# Patient Record
Sex: FEMALE | Race: WHITE
[De-identification: names, ages, dates, MRNs, and addresses within clinical notes are randomized per-mention and may not be internally consistent; named-entity substitution may affect disease eponyms.]

---

## 2017-04-19 ENCOUNTER — HOSPITAL ENCOUNTER (EMERGENCY)
Dept: HOSPITAL 10 - FTE | Age: 37
LOS: 1 days | Discharge: HOME | End: 2017-04-20
Payer: COMMERCIAL

## 2017-04-19 VITALS
BODY MASS INDEX: 39.45 KG/M2 | WEIGHT: 222.67 LBS | WEIGHT: 222.67 LBS | BODY MASS INDEX: 39.45 KG/M2 | HEIGHT: 63 IN | HEIGHT: 63 IN

## 2017-04-19 DIAGNOSIS — M62.838: Primary | ICD-10-CM

## 2017-04-19 DIAGNOSIS — I10: ICD-10-CM

## 2017-04-19 PROCEDURE — 72040 X-RAY EXAM NECK SPINE 2-3 VW: CPT

## 2017-04-19 RX ADMIN — IBUPROFEN ONE MG: 200 TABLET, FILM COATED ORAL at 23:08

## 2017-04-19 RX ADMIN — IBUPROFEN ONE MG: 200 TABLET, FILM COATED ORAL at 23:06

## 2017-04-19 NOTE — ERD
ER Documentation


Chief Complaint


Date/Time


DATE: 4/19/17 


TIME: 23:09


Chief Complaint


left arm numbness x 1 week, left knee pain x 2 days, strength on L hand 5/5





HPI


This 36-year-old female presents to emergency department reporting that her 

left arm has been falling asleep at night for the last week and a half.  

Currently it has been continuing to feel numbness and tingling throughout the 

day.  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

, has recently been diagnosed with fibromyalgia, is not started on any 

medication yet.  Patient denies lupus, rheumatoid arthritis.  Reports current 

symptoms include heavy arms, hip pain, neck pain and stiffness, joint pain and 

swelling.  Patient reports that her left leg has also felt "tingling".  States 

that her left knee gave out today.  Patient denies falling, hitting her head or 

any other injury.  Patient denies change in vision or speech, denies headache.





ROS


All systems reviewed and are negative except as per history of present illness.





Medications


Home Meds


Active Scripts


Ibuprofen* (Motrin*) 600 Mg Tab, 600 MG PO Q6H Y for PAIN AND OR ELEVATED TEMP, 

#30 TAB


   Prov:NUHA FRANKS NP         10/25/16


Docusate Sodium* (Colace*) 100 Mg Capsule, 100 MG PO TID, #30 CAP


   Prov:NUHA FRANKS NP         10/25/16


Ferrous Sulfate* (Ferrous Sulfate*) 325 Mg Tabec, 325 MG PO BID, #60 TAB


   Prov:NUHA FRANKS NP         10/25/16


Nitrofurantoin Monohyd Macrocr* (Macrobid*) 100 Mg Capsr, 100 MG PO BID for 7 

Days, #14 CAP 0 Refills


   Prov:ADARSH MEDINA PA-C         6/3/16


Ibuprofen* (Motrin*) 600 Mg Tab, 600 MG PO Q6, #15 TAB


   Prov:PHANI ISABEL MD         11/17/15


Azithromycin* (Zithromax*) 250 Mg Tablet, 250 MG PO .ZPACK AS DIRECTED, #6 TAB


   TAKE 500 MG (2 TABS) THE FIRST DAY THEN 250 MG (1 TAB) DAYS 2-5


   Prov:PHANI ISABEL MD         11/17/15


Promethazine w/Codeine (Phenergan w/Codeine Syrup) 5 Ml Syrup, 5 ML PO Q6 Y for 

COUGH for 7 Days, ML


   6 OZ


   Prov:PHANI ISABEL MD         11/17/15


Reported Medications


[No Meds]   No Conflict Check


   12/4/13





Allergies


Allergies:  


Coded Allergies:  


     No Known Allergy (Unverified , 12/4/13)





PMhx/Soc


History of Surgery:  No


Anesthesia Reaction:  No


Hx Neurological Disorder:  No


Hx Respiratory Disorders:  No


Hx Cardiac Disorders:  Yes (HTN)


Hx Psychiatric Problems:  No


Hx Miscellaneous Medical Probl:  Yes (UTERINE POLYP)


Hx Alcohol Use:  No


Hx Substance Use:  No


Hx Tobacco Use:  No





Physical Exam


Vitals





Vital Signs








  Date Time  Temp Pulse Resp B/P Pulse Ox O2 Delivery O2 Flow Rate FiO2


 


4/19/17 19:43 98.3 118 20 134/67 98   





Vitals stable, heart rate elevated at 118, blood pressure 134/67.  Afebrile.  

Triage notes reviewed


Physical Exam


Const:     Obese, no acute distress


Head:   Atraumatic 


Eyes:    Normal Conjunctiva, PERRLA, EOMI


ENT:    Normal External Ears, Nose and Mouth.  Mucous membranes moist


Neck:               Full range of motion.  Paraspinal tenderness, trapezial and 

rhomboid tenderness  bilaterally; left greater than right


Resp:    


Cardio:    


Abd:    


Skin:   


Back:    No midline or flank tenderness


Ext:    


Neur:    Awake and alert


Psych:    Normal Mood and Affect


Results 24 hrs





 Current Medications








 Medications


  (Trade)  Dose


 Ordered  Sig/Radha


 Route


 PRN Reason  Start Time


 Stop Time Status Last Admin


Dose Admin


 


 Ibuprofen


  (Motrin)  400 mg  ONCE  ONCE


 PO


   4/19/17 23:00


 4/19/17 23:09 DC  


 


 


 Diazepam


  (Valium)  5 mg  ONCE  ONCE


 PO


   4/19/17 23:30


 4/19/17 23:31 DC 4/19/17 23:18


 


 


 Acetaminophen


  (Tylenol Tab)  650 mg  ONCE  ONCE


 PO


   4/19/17 23:30


 4/19/17 23:31 DC 4/19/17 23:18


 











Procedures/MDM


PROCEDURE:   XR Cervical Spine. 


 


CLINICAL INDICATION:   Radiculopathy.


 


TECHNIQUE:   Three views of the cervical spine were performed. The images were 

reviewed on a PACS workstation. 


 


COMPARISON:   None. 


 


FINDINGS:


 


No fracture is identified. There is reversal of the normal cervical lordosis. 

Alignment is otherwise maintained.  There is maintenance of height of the 

vertebral bodies.  Bone mineralization is within normal limits.  There is mild 

predominately anterior endplate hypertrophic changes greatest at C4-5..  

Prevertebral soft tissues are unremarkable.  


 


IMPRESSION:


 


1.  Reversal of the normal cervical lordosis, most commonly seen with muscle 

spasm.


2.  No acute fracture or subluxation.


3.  Predominately anterior endplate hypertrophic changes greatest at C4-5.


 


.Gurwinder Rankin MD, MD           Date    Time 


Electronically viewed and signed by .Gurwinder Rankin MD, MD on 04/20/2017 00:15 








This 36-year-old female presents to emergency department with neck stiffness, 

left arm tingling starting at night progressing to throughout the day over the 

last week and a half.  History of fibromyalgia, patient reports that she is 

working with a new doctor who will be starting her on medication for 

fibromyalgia once her laboratory results come back.  Patient denies any pre-

existing injury.  She has been evaluated and ruled out for lupus and rheumatoid 

arthritis.  Differential diagnosis includes but is not limited to cerebral 

lesion, spinal cord lesion.  Degenerative disc disease, radiculopathy, disc 

herniation.  Fibromyalgia.  Physical exam and history do not support cerebral 

lesion or spinal cord lesion, cervical x-ray obtained with findings of no 

fracture identified.  There is reversal of the normal cervical lordosis.  

Alignment is otherwise maintained.  There is maintenance of the height of the 

vertebral bodies.  Bone mineralization is within normal limits.  There is mild 

predominantly anterior endplate hypertrophic changes greatest at C4-C5.  

Prevertebral soft tissues are unremarkable.  Impression documents reversal of 

the normal cervical lordosis most commonly seen with muscle spasm.  No acute 

fracture or subluxation.  Predominantly anterior endplate hypertrophic changes 

greatest at C4-C5.  Patient given Tylenol and Valium for pain, reassessed after 

60 minutes with improvement of symptoms.  I feel patient is excellent candidate 

for outpatient management and treatment by primary care physician.  Patient 

will be sent home with Motrin, Valium.  Return to emergency department as 

needed worsening of symptoms I feel the patient is stable for discharge at this 

time.  I have discussed results, examination findings, the treatment plan with 

the patient and family present prior to discharge.  Indications for emergent 

reevaluation, side effects of medication were also discussed.  All questions 

were answered.  Patient verbalizes understanding and agrees with plan of care.





Departure


Diagnosis:  


 Primary Impression:  


 Cervical paraspinal muscle spasm


Condition:  Good


Patient Instructions:  Neck Sprain/Strain





Additional Instructions:  


Thank you for for coming to [facility] for your care today. 

Please ask your nurse or provider if you have questions about your care today 

and do not leave until all your questions have been answered.  Please use any 

medications given as directed and follow-up with your doctor (or the doctor you 

were referred to) in the next 2-3 days. If you do not have a primary care 

doctor you may follow up at the [facility] (listed below). You may also 

use motrin and tylenol as needed for fever and/or pain unless instructed 

otherwise by your provider or nurse. Indications for more urgent follow-up have 

been discussed, but you may return to the Emergency Department at ANY time for 

any worrisome or worsening symptoms.





If you have abdominal pain, please know that no test or exam you received is 

perfect and you should follow up within 8 hours for continued pain.





If you had any imaging studies today, such as an X-Ray or CT Scan, these 

studies will be reviewed later by a radiologist. You will be called if there 

are important findings that were not identified today, so make sure the contact 

information you provided at registration is correct.





If you received any narcotic pain control medicine today, such as Vicodin, 

Morphine or Dilaudid, your coordination and judgment may be affected for a 

number of hours. Please do not drive or operate heavy machinery, and you may 

want someone to assist you at home. If you were given a prescription for 

narcotic medication, be aware that it is very addictive- use sparingly and only 

if necessary.











CHAD ABRAHAM Apr 19, 2017 23:16

## 2017-04-20 VITALS
SYSTOLIC BLOOD PRESSURE: 133 MMHG | DIASTOLIC BLOOD PRESSURE: 72 MMHG | RESPIRATION RATE: 20 BRPM | HEART RATE: 97 BPM | TEMPERATURE: 98 F

## 2017-04-20 NOTE — RADRPT
PROCEDURE:   XR Cervical Spine. 

 

CLINICAL INDICATION:   Radiculopathy.

 

TECHNIQUE:   Three views of the cervical spine were performed. The images were reviewed on a PACS [place]. 

 

COMPARISON:   None. 

 

FINDINGS:

 

No fracture is identified. There is reversal of the normal cervical lordosis. Alignment is otherwise
 maintained.  There is maintenance of height of the vertebral bodies.  Bone mineralization is within
 normal limits.  There is mild predominately anterior endplate hypertrophic changes greatest at C4-5
..  Prevertebral soft tissues are unremarkable.  

 

IMPRESSION:

 

1.  Reversal of the normal cervical lordosis, most commonly seen with muscle spasm.

2.  No acute fracture or subluxation.

3.  Predominately anterior endplate hypertrophic changes greatest at C4-5.

 

 

RPTAT:  HMVK

_____________________________________________ 

.Gurwinder Rankin MD, MD           Date    Time 

Electronically viewed and signed by .Gurwinder Rankin MD, MD on 04/20/2017 00:15 

 

D:  04/20/2017 00:15  T:  04/20/2017 00:15

.K/

## 2017-04-24 ENCOUNTER — HOSPITAL ENCOUNTER (OUTPATIENT)
Dept: HOSPITAL 10 - GIL | Age: 37
Discharge: HOME | End: 2017-04-24
Attending: INTERNAL MEDICINE
Payer: COMMERCIAL

## 2017-04-24 VITALS — DIASTOLIC BLOOD PRESSURE: 71 MMHG | RESPIRATION RATE: 18 BRPM | HEART RATE: 95 BPM | SYSTOLIC BLOOD PRESSURE: 127 MMHG

## 2017-04-24 VITALS
HEIGHT: 63 IN | BODY MASS INDEX: 38.36 KG/M2 | HEIGHT: 63 IN | WEIGHT: 216.49 LBS | WEIGHT: 216.49 LBS | BODY MASS INDEX: 38.36 KG/M2

## 2017-04-24 VITALS — DIASTOLIC BLOOD PRESSURE: 68 MMHG | SYSTOLIC BLOOD PRESSURE: 110 MMHG | HEART RATE: 95 BPM | RESPIRATION RATE: 20 BRPM

## 2017-04-24 DIAGNOSIS — K63.5: ICD-10-CM

## 2017-04-24 DIAGNOSIS — K20.9: ICD-10-CM

## 2017-04-24 DIAGNOSIS — K64.4: ICD-10-CM

## 2017-04-24 DIAGNOSIS — B96.81: ICD-10-CM

## 2017-04-24 DIAGNOSIS — I10: ICD-10-CM

## 2017-04-24 DIAGNOSIS — K25.9: ICD-10-CM

## 2017-04-24 DIAGNOSIS — E66.9: ICD-10-CM

## 2017-04-24 DIAGNOSIS — K92.1: ICD-10-CM

## 2017-04-24 DIAGNOSIS — K64.1: ICD-10-CM

## 2017-04-24 DIAGNOSIS — K29.50: Primary | ICD-10-CM

## 2017-04-24 DIAGNOSIS — K62.1: ICD-10-CM

## 2017-04-24 PROCEDURE — 88305 TISSUE EXAM BY PATHOLOGIST: CPT

## 2017-04-24 PROCEDURE — 88312 SPECIAL STAINS GROUP 1: CPT

## 2017-04-24 PROCEDURE — 43239 EGD BIOPSY SINGLE/MULTIPLE: CPT

## 2017-04-24 PROCEDURE — 45380 COLONOSCOPY AND BIOPSY: CPT

## 2017-04-25 NOTE — GILP
DATE OF PROCEDURE:  04/24/2017

 

 

PROCEDURE:  Esophagogastroduodenoscopy with biopsies.

 

BRIEF HISTORY AND INDICATIONS:  The patient is being evaluated for dyspepsia.

 

PREMEDICATION:  Monitored anesthesia care by anesthesiologist.

 

SURGEON:  Elke Mendiola MD

 

INSTRUMENT USED:  Olympus panendoscope.

 

TECHNIQUE: After informed consent, with the patient/relatives understanding the procedure, its indic
ations, potential risks and complications, including but not limited to: allergic reaction, bleeding
, perforation or infection, and after all pertinent questions were answered to the patients satisfac
tion, the patient/relatives signed witnessed informed consent. 

 

Following this, premedication was administered slowly IV push under careful cardiovascular and respi
ratory monitoring with pulse oximetry, automatic blood pressure and EKG monitor.

 

Once the sedative effect was achieved the patient was place in the left lateral decubitus, the panen
doscope was introduced and advanced under visual control.

 

Careful examination of the upper gastrointestinal tract, both on insertion as well as withdrawal of 
the instrument disclosed the following findings:

 

ESOPHAGUS:  The distal esophagus shows erythema and edema of the mucosa of a moderate degree.

 

STOMACH:  Upon entrance to the stomach, air was insufflated, the gastric walls distended normally.  
The mucosa of the fundus, body, and antrum of the stomach was carefully examined and shows erythema 
and edema of the mucosa.  There are 2 linear ulcerations in the antrum of the stomach.  Biopsies wer
e obtained to rule out H. pylori infection.

 

PYLORUS:  The pylorus appears patent and within normal limits, with no evidence of gastric outlet ob
struction.

 

DUODENUM:  The duodenal mucosa was carefully examined in the duodenal bulb as well as the second por
tion of the duodenum and appears unremarkable with no evidence of duodenitis, ulcer or neoplasm.

 

The instrument was then withdrawn, the patient tolerated the procedure well and was transfer out of 
the endoscopy suite awake, and in good condition to continue recovery under observation

 

IMPRESSION:

1.  Moderate distal esophagitis.

2.  Gastritis with linear ulcerations in the antrum.  Biopsies obtained.

 

PLAN:  The patient will be treated with PPIs.  Further recommendation will depend on her clinical co
urse as well as review of biopsies.

 

 

Dictated By: ELKE MENDIOLA MS/JUAN

DD:    04/24/2017 17:20:18

DT:    04/25/2017 04:14:36

Conf#: [id number]

DID#:  [id number]

## 2017-04-25 NOTE — GILP
DATE OF PROCEDURE:  04/24/2017

 

 

PROCEDURE:  Colonoscopy with polyp ablation.  

 

BRIEF HISTORY AND INDICATIONS:  The patient is being evaluated for hematochezia.

 

PREMEDICATION:  Monitored anesthesia care by anesthesiologist.

 

SURGEON:  Elke Mendiola MD

 

INSTRUMENT USED:  Olympus colonoscope.

 

PREPARATION:  Adequate.

 

TECHNIQUE: After informed consent, with the patient/relatives understanding the procedure, its indic
ations potential risks and complications, including but not limited to: allergic reaction, bleeding,
 perforation, infection, missed lesions and after all pertinent questions were answered to the patie
nt's satisfaction, the patient/relatives signed the witnessed informed consent.

 

Following this, premedication was administered slowly IV push by under careful cardiovascular and re
spiratory monitoring with pulse oximetry, automatic blood pressure and EKG monitor. Once the sedativ
e effect was achieved, the patient was placed in the left lateral decubitus position, digital rectal
 examination was performed. The colonoscope was then introduced and advanced under visual control th
roughout all segments of the colon including: the rectum, sigmoid, descending colon, splenic flexure
, transverse colon, hepatic flexure, ascending colon and finally reaching the cecum which was clearl
y identified by transillumination, finger indentation and the ileocecal valve. Careful examination o
f the mucosa of the lower gastrointestinal tract both on insertion as well as withdrawal of the inst
rument disclosed the following findings:

 

Rectal Examination:  Shows moderate size external hemorrhoids and grade II prolapsing internal hemor
rhoids.

 

Colonic Mucosa:  There is a 3 mm polyp in the rectum which was ablated with biopsy forceps.

 

There are 2 small polyps measuring 3 and 4 mm in the sigmoid colon which were also ablated with biop
sy forceps.  The remainder of colonic mucosa unremarkable.  Random biopsies were obtained to rule ou
t macroscopic lymphocytic or collagenous colitis.  Large internal hemorrhoids are present on withdra
wal of the instrument through the anal canal.

 

The patient tolerated the procedure well and was transferred out of the Endoscopy Suite awake and in
 good condition to continue recovery under observation.

 

IMPRESSION:

1.  A 3 mm polyp in the rectum, ablated.

2.  Two small polyps in the sigmoid, ablated.

3.  Rule out microscopic lymphocytic or collagenous colitis.  Random biopsies obtained.

4.  Large internal hemorrhoids with prolapse, grade II, moderate sized external hemorrhoids.

 

PLAN:  Pathology will be reviewed as soon as available.  Colonoscopy in 5 years is recommended.  Col
orectal surgery evaluation is recommended.

 

 

Dictated By: ELKE MENDIOLA MS/JUAN

DD:    04/24/2017 17:22:53

DT:    04/25/2017 04:26:04

Conf#: [id number]

DID#:  [id number]

## 2017-07-31 ENCOUNTER — HOSPITAL ENCOUNTER (OUTPATIENT)
Dept: HOSPITAL 10 - GIL | Age: 37
Discharge: HOME | End: 2017-07-31
Attending: INTERNAL MEDICINE
Payer: COMMERCIAL

## 2017-07-31 VITALS
BODY MASS INDEX: 40.49 KG/M2 | HEIGHT: 62 IN | WEIGHT: 220.02 LBS | WEIGHT: 220.02 LBS | BODY MASS INDEX: 40.49 KG/M2 | HEIGHT: 62 IN

## 2017-07-31 VITALS — HEART RATE: 90 BPM | SYSTOLIC BLOOD PRESSURE: 124 MMHG | RESPIRATION RATE: 18 BRPM | DIASTOLIC BLOOD PRESSURE: 74 MMHG

## 2017-07-31 DIAGNOSIS — K29.50: Primary | ICD-10-CM

## 2017-07-31 PROCEDURE — 88312 SPECIAL STAINS GROUP 1: CPT

## 2017-07-31 PROCEDURE — 88305 TISSUE EXAM BY PATHOLOGIST: CPT

## 2017-07-31 PROCEDURE — 43239 EGD BIOPSY SINGLE/MULTIPLE: CPT

## 2017-07-31 NOTE — GILP
DATE OF PROCEDURE:  07/31/2017

 

 

 

PROCEDURE PERFORMED:  Esophagogastroduodenoscopy.

 

 

 

INDICATION:  The patient here for followup of previously

identified gastric ulceration.

 

 

 

MEDICATION MONITOR, ANESTHESIA CARE:  By anesthesiologist.

 

 

 

INSTRUMENT USED:  Olympus endoscope.

 

 

 

TECHNIQUE:  After informed consent, with the patient/relatives

understanding the procedure, its indications, potential risks and

complications, including but not limited to: allergic reaction,

bleeding, perforation or infection, and after all pertinent

questions were answered to the patients satisfaction, the

patient/relatives signed witnessed informed consent.

 

 

Following this, premedication was administered slowly IV push

under careful cardiovascular and respiratory monitoring with

pulse oximetry, automatic blood pressure and EKG monitor.

 

 

Once the sedative effect was achieved the patient was place in

the left lateral decubitus, the panendoscope was introduced and

advanced under visual control.

 

 

Careful examination of the upper gastrointestinal tract, both on

insertion as well as withdrawal of the instrument disclosed the

following findings:

 

 

 

ESOPHAGUS:  Esophagus normal.  The mucosa of the entire esophagus

appears within normal limits. There is no evidence of

esophagitis, varices, neoplasm or stricture.  No Hiatal Hernia

identified.

 

 

 

STOMACH:  Upon examination of the stomach there was some [____].

In the area of previously noted gastric ulceration are healed.

There is however persistent erythema and edema and superficial

erosion of the mucosa in the antrum.  Biopsies were obtained to

rule out H. pylori infection.  Upon entrance to the stomach air

was insufflated, the gastric walls distended normally. The mucosa

of the fundus, body and antrum of the stomach was carefully

examined both head-on and on retroflexion, and shows no

abnormalities. There is no evidence of gastritis, ulcers or

neoplasm.

 

 

 

PYLORUS:  Pylorus normal.  The pylorus appears patent and within

normal limits, with no evidence of gastric outlet obstruction.

 

 

 

DUODENUM:  Duodenum normal.  The duodenal mucosa was carefully

examined in the duodenal bulb as well as the second portion of

the duodenum and appears unremarkable with no evidence of

duodenitis, ulcer or neoplasm.

 

 

The instrument was then withdrawn, the patient tolerated the

procedure well and was transfer out of the endoscopy suite awake,

and in good condition to continue recovery under observation.

 

 

 

IMPRESSION:

 

1.   Healed gastric ulcerations.

2.   Antrum gastritis, rule out Helicobacter pylori infection,

biopsies obtained.

 

 

RECOMMENDATIONS:  The patient will continue on PPI.  Further

recommendations will depend on her clinical course and well as

biopsies.

 

 

 

 

 

 

 

Dictated By:  Silvio Mendiola MD

 

 

 

/tyrone/andrea

 

Job#:  [id number]/Document#:  [id number]

 

D:  07/31/2017 14:57

 

T:  07/31/2017 17:31

## 2019-03-29 ENCOUNTER — HOSPITAL ENCOUNTER (OUTPATIENT)
Dept: HOSPITAL 10 - C/S | Age: 39
Discharge: HOME | End: 2019-03-29
Attending: INTERNAL MEDICINE
Payer: COMMERCIAL

## 2019-03-29 ENCOUNTER — HOSPITAL ENCOUNTER (OUTPATIENT)
Dept: HOSPITAL 91 - C/S | Age: 39
Discharge: HOME | End: 2019-03-29
Payer: COMMERCIAL

## 2019-03-29 DIAGNOSIS — R10.12: Primary | ICD-10-CM

## 2019-03-29 PROCEDURE — 74170 CT ABD WO CNTRST FLWD CNTRST: CPT

## 2019-03-29 RX ADMIN — IOHEXOL 1 ML: 300 INJECTION, SOLUTION INTRAVENOUS at 10:58

## 2019-03-29 RX ADMIN — SODIUM CHLORIDE 1 ML: 9 INJECTION, SOLUTION INTRAMUSCULAR; INTRAVENOUS; SUBCUTANEOUS at 10:57

## 2019-03-29 RX ADMIN — VASOPRESSIN 1 ML/8 S: 20 INJECTION, SOLUTION INTRAMUSCULAR; SUBCUTANEOUS at 10:59
